# Patient Record
Sex: MALE | Race: OTHER | NOT HISPANIC OR LATINO
[De-identification: names, ages, dates, MRNs, and addresses within clinical notes are randomized per-mention and may not be internally consistent; named-entity substitution may affect disease eponyms.]

---

## 2024-03-31 VITALS
TEMPERATURE: 100 F | RESPIRATION RATE: 16 BRPM | OXYGEN SATURATION: 97 % | HEART RATE: 104 BPM | SYSTOLIC BLOOD PRESSURE: 120 MMHG | DIASTOLIC BLOOD PRESSURE: 72 MMHG

## 2024-03-31 LAB
ANION GAP SERPL CALC-SCNC: 10 MMOL/L — SIGNIFICANT CHANGE UP (ref 5–17)
BASOPHILS # BLD AUTO: 0.03 K/UL — SIGNIFICANT CHANGE UP (ref 0–0.2)
BASOPHILS NFR BLD AUTO: 0.2 % — SIGNIFICANT CHANGE UP (ref 0–2)
BUN SERPL-MCNC: 16 MG/DL — SIGNIFICANT CHANGE UP (ref 7–23)
CALCIUM SERPL-MCNC: 9.6 MG/DL — SIGNIFICANT CHANGE UP (ref 8.4–10.5)
CHLORIDE SERPL-SCNC: 99 MMOL/L — SIGNIFICANT CHANGE UP (ref 96–108)
CO2 SERPL-SCNC: 26 MMOL/L — SIGNIFICANT CHANGE UP (ref 22–31)
CREAT SERPL-MCNC: 1.19 MG/DL — SIGNIFICANT CHANGE UP (ref 0.5–1.3)
EGFR: 85 ML/MIN/1.73M2 — SIGNIFICANT CHANGE UP
EOSINOPHIL # BLD AUTO: 0.01 K/UL — SIGNIFICANT CHANGE UP (ref 0–0.5)
EOSINOPHIL NFR BLD AUTO: 0.1 % — SIGNIFICANT CHANGE UP (ref 0–6)
GLUCOSE SERPL-MCNC: 111 MG/DL — HIGH (ref 70–99)
HCT VFR BLD CALC: 40.7 % — SIGNIFICANT CHANGE UP (ref 39–50)
HGB BLD-MCNC: 13.6 G/DL — SIGNIFICANT CHANGE UP (ref 13–17)
IMM GRANULOCYTES NFR BLD AUTO: 0.3 % — SIGNIFICANT CHANGE UP (ref 0–0.9)
LYMPHOCYTES # BLD AUTO: 0.6 K/UL — LOW (ref 1–3.3)
LYMPHOCYTES # BLD AUTO: 4.2 % — LOW (ref 13–44)
MCHC RBC-ENTMCNC: 30.3 PG — SIGNIFICANT CHANGE UP (ref 27–34)
MCHC RBC-ENTMCNC: 33.4 GM/DL — SIGNIFICANT CHANGE UP (ref 32–36)
MCV RBC AUTO: 90.6 FL — SIGNIFICANT CHANGE UP (ref 80–100)
MONOCYTES # BLD AUTO: 1.27 K/UL — HIGH (ref 0–0.9)
MONOCYTES NFR BLD AUTO: 8.9 % — SIGNIFICANT CHANGE UP (ref 2–14)
NEUTROPHILS # BLD AUTO: 12.27 K/UL — HIGH (ref 1.8–7.4)
NEUTROPHILS NFR BLD AUTO: 86.3 % — HIGH (ref 43–77)
NRBC # BLD: 0 /100 WBCS — SIGNIFICANT CHANGE UP (ref 0–0)
PLATELET # BLD AUTO: 191 K/UL — SIGNIFICANT CHANGE UP (ref 150–400)
POTASSIUM SERPL-MCNC: 4.5 MMOL/L — SIGNIFICANT CHANGE UP (ref 3.5–5.3)
POTASSIUM SERPL-SCNC: 4.5 MMOL/L — SIGNIFICANT CHANGE UP (ref 3.5–5.3)
RBC # BLD: 4.49 M/UL — SIGNIFICANT CHANGE UP (ref 4.2–5.8)
RBC # FLD: 12.4 % — SIGNIFICANT CHANGE UP (ref 10.3–14.5)
SODIUM SERPL-SCNC: 135 MMOL/L — SIGNIFICANT CHANGE UP (ref 135–145)
WBC # BLD: 14.22 K/UL — HIGH (ref 3.8–10.5)
WBC # FLD AUTO: 14.22 K/UL — HIGH (ref 3.8–10.5)

## 2024-03-31 PROCEDURE — 99285 EMERGENCY DEPT VISIT HI MDM: CPT

## 2024-03-31 PROCEDURE — 70491 CT SOFT TISSUE NECK W/DYE: CPT | Mod: 26,MC

## 2024-03-31 RX ORDER — FAMOTIDINE 10 MG/ML
20 INJECTION INTRAVENOUS ONCE
Refills: 0 | Status: COMPLETED | OUTPATIENT
Start: 2024-03-31 | End: 2024-03-31

## 2024-03-31 RX ORDER — AMPICILLIN SODIUM AND SULBACTAM SODIUM 250; 125 MG/ML; MG/ML
3 INJECTION, POWDER, FOR SUSPENSION INTRAMUSCULAR; INTRAVENOUS ONCE
Refills: 0 | Status: COMPLETED | OUTPATIENT
Start: 2024-03-31 | End: 2024-03-31

## 2024-03-31 RX ORDER — KETOROLAC TROMETHAMINE 30 MG/ML
15 SYRINGE (ML) INJECTION ONCE
Refills: 0 | Status: DISCONTINUED | OUTPATIENT
Start: 2024-03-31 | End: 2024-03-31

## 2024-03-31 RX ORDER — DEXAMETHASONE 0.5 MG/5ML
10 ELIXIR ORAL ONCE
Refills: 0 | Status: COMPLETED | OUTPATIENT
Start: 2024-03-31 | End: 2024-03-31

## 2024-03-31 RX ORDER — SODIUM CHLORIDE 9 MG/ML
1000 INJECTION, SOLUTION INTRAVENOUS ONCE
Refills: 0 | Status: COMPLETED | OUTPATIENT
Start: 2024-03-31 | End: 2024-03-31

## 2024-03-31 RX ORDER — DIPHENHYDRAMINE HCL 50 MG
50 CAPSULE ORAL ONCE
Refills: 0 | Status: COMPLETED | OUTPATIENT
Start: 2024-03-31 | End: 2024-03-31

## 2024-03-31 RX ADMIN — AMPICILLIN SODIUM AND SULBACTAM SODIUM 200 GRAM(S): 250; 125 INJECTION, POWDER, FOR SUSPENSION INTRAMUSCULAR; INTRAVENOUS at 21:35

## 2024-03-31 RX ADMIN — Medication 15 MILLIGRAM(S): at 21:34

## 2024-03-31 RX ADMIN — Medication 50 MILLIGRAM(S): at 23:49

## 2024-03-31 RX ADMIN — SODIUM CHLORIDE 1000 MILLILITER(S): 9 INJECTION, SOLUTION INTRAVENOUS at 23:49

## 2024-03-31 RX ADMIN — Medication 102 MILLIGRAM(S): at 21:34

## 2024-03-31 RX ADMIN — FAMOTIDINE 20 MILLIGRAM(S): 10 INJECTION INTRAVENOUS at 23:49

## 2024-03-31 NOTE — ED ADULT TRIAGE NOTE - CHIEF COMPLAINT QUOTE
Pt. presents to ED with a swollen uvula. Pt. dx with strep today, but reports more swelling of his uvula. He is concerned because it is getting more difficult to swallow and notices voices changes.

## 2024-03-31 NOTE — ED ADULT NURSE NOTE - OBJECTIVE STATEMENT
29yo male c/o throat swelling. Pt presents with friend who reports he was seen in an urgent care today and diagnosed with strep, his uvula had moderate amount of swelling and was dc'd with oral abx rx. His throat has since gotten gradually more tight and pt verbalizes it is difficult for him to speak, swallow, and breathe. Pt's visitor states this has happened before approx a year ago, denies hx intubation. Denies allergies. AAOx4, ambulatory, appears mildly uncomfortable but NAD. MD Carter aware, IV access initiated.

## 2024-04-01 ENCOUNTER — TRANSCRIPTION ENCOUNTER (OUTPATIENT)
Age: 29
End: 2024-04-01

## 2024-04-01 ENCOUNTER — INPATIENT (INPATIENT)
Facility: HOSPITAL | Age: 29
LOS: 0 days | Discharge: HOME | DRG: 872 | End: 2024-04-01
Attending: STUDENT IN AN ORGANIZED HEALTH CARE EDUCATION/TRAINING PROGRAM | Admitting: STUDENT IN AN ORGANIZED HEALTH CARE EDUCATION/TRAINING PROGRAM
Payer: COMMERCIAL

## 2024-04-01 VITALS
OXYGEN SATURATION: 97 % | TEMPERATURE: 98 F | HEART RATE: 74 BPM | DIASTOLIC BLOOD PRESSURE: 60 MMHG | SYSTOLIC BLOOD PRESSURE: 101 MMHG | RESPIRATION RATE: 18 BRPM

## 2024-04-01 DIAGNOSIS — Z98.890 OTHER SPECIFIED POSTPROCEDURAL STATES: Chronic | ICD-10-CM

## 2024-04-01 DIAGNOSIS — A41.9 SEPSIS, UNSPECIFIED ORGANISM: ICD-10-CM

## 2024-04-01 DIAGNOSIS — J02.9 ACUTE PHARYNGITIS, UNSPECIFIED: ICD-10-CM

## 2024-04-01 DIAGNOSIS — Z29.9 ENCOUNTER FOR PROPHYLACTIC MEASURES, UNSPECIFIED: ICD-10-CM

## 2024-04-01 PROCEDURE — 70491 CT SOFT TISSUE NECK W/DYE: CPT | Mod: MC

## 2024-04-01 PROCEDURE — 85025 COMPLETE CBC W/AUTO DIFF WBC: CPT

## 2024-04-01 PROCEDURE — 36415 COLL VENOUS BLD VENIPUNCTURE: CPT

## 2024-04-01 PROCEDURE — 80048 BASIC METABOLIC PNL TOTAL CA: CPT

## 2024-04-01 PROCEDURE — 99222 1ST HOSP IP/OBS MODERATE 55: CPT | Mod: GC

## 2024-04-01 PROCEDURE — 96375 TX/PRO/DX INJ NEW DRUG ADDON: CPT

## 2024-04-01 PROCEDURE — 96374 THER/PROPH/DIAG INJ IV PUSH: CPT

## 2024-04-01 PROCEDURE — 99285 EMERGENCY DEPT VISIT HI MDM: CPT | Mod: 25

## 2024-04-01 PROCEDURE — 87040 BLOOD CULTURE FOR BACTERIA: CPT

## 2024-04-01 PROCEDURE — 99236 HOSP IP/OBS SAME DATE HI 85: CPT | Mod: GC

## 2024-04-01 RX ORDER — PANTOPRAZOLE SODIUM 20 MG/1
40 TABLET, DELAYED RELEASE ORAL ONCE
Refills: 0 | Status: COMPLETED | OUTPATIENT
Start: 2024-04-01 | End: 2024-04-01

## 2024-04-01 RX ORDER — DEXAMETHASONE 0.5 MG/5ML
1 ELIXIR ORAL
Qty: 4 | Refills: 0
Start: 2024-04-01 | End: 2024-04-04

## 2024-04-01 RX ORDER — ACETAMINOPHEN 500 MG
650 TABLET ORAL EVERY 6 HOURS
Refills: 0 | Status: DISCONTINUED | OUTPATIENT
Start: 2024-04-01 | End: 2024-04-01

## 2024-04-01 RX ORDER — DEXAMETHASONE 0.5 MG/5ML
5 ELIXIR ORAL EVERY 24 HOURS
Refills: 0 | Status: DISCONTINUED | OUTPATIENT
Start: 2024-04-01 | End: 2024-04-01

## 2024-04-01 RX ORDER — BENZOCAINE AND MENTHOL 5; 1 G/100ML; G/100ML
1 LIQUID ORAL
Qty: 1 | Refills: 0
Start: 2024-04-01 | End: 2024-04-05

## 2024-04-01 RX ORDER — INFLUENZA VIRUS VACCINE 15; 15; 15; 15 UG/.5ML; UG/.5ML; UG/.5ML; UG/.5ML
0.5 SUSPENSION INTRAMUSCULAR ONCE
Refills: 0 | Status: DISCONTINUED | OUTPATIENT
Start: 2024-04-01 | End: 2024-04-01

## 2024-04-01 RX ORDER — SODIUM CHLORIDE 9 MG/ML
1000 INJECTION INTRAMUSCULAR; INTRAVENOUS; SUBCUTANEOUS
Refills: 0 | Status: DISCONTINUED | OUTPATIENT
Start: 2024-04-01 | End: 2024-04-01

## 2024-04-01 RX ORDER — PANTOPRAZOLE SODIUM 20 MG/1
40 TABLET, DELAYED RELEASE ORAL
Refills: 0 | Status: DISCONTINUED | OUTPATIENT
Start: 2024-04-02 | End: 2024-04-01

## 2024-04-01 RX ORDER — ONDANSETRON 8 MG/1
4 TABLET, FILM COATED ORAL EVERY 8 HOURS
Refills: 0 | Status: DISCONTINUED | OUTPATIENT
Start: 2024-04-01 | End: 2024-04-01

## 2024-04-01 RX ORDER — BENZOCAINE AND MENTHOL 5; 1 G/100ML; G/100ML
1 LIQUID ORAL ONCE
Refills: 0 | Status: COMPLETED | OUTPATIENT
Start: 2024-04-01 | End: 2024-04-01

## 2024-04-01 RX ORDER — AMPICILLIN SODIUM AND SULBACTAM SODIUM 250; 125 MG/ML; MG/ML
3 INJECTION, POWDER, FOR SUSPENSION INTRAMUSCULAR; INTRAVENOUS EVERY 6 HOURS
Refills: 0 | Status: DISCONTINUED | OUTPATIENT
Start: 2024-04-01 | End: 2024-04-01

## 2024-04-01 RX ORDER — BENZOCAINE AND MENTHOL 5; 1 G/100ML; G/100ML
1 LIQUID ORAL
Refills: 0 | Status: DISCONTINUED | OUTPATIENT
Start: 2024-04-01 | End: 2024-04-01

## 2024-04-01 RX ORDER — DIPHENHYDRAMINE HYDROCHLORIDE AND LIDOCAINE HYDROCHLORIDE AND ALUMINUM HYDROXIDE AND MAGNESIUM HYDRO
15 KIT EVERY 12 HOURS
Refills: 0 | Status: DISCONTINUED | OUTPATIENT
Start: 2024-04-01 | End: 2024-04-01

## 2024-04-01 RX ADMIN — AMPICILLIN SODIUM AND SULBACTAM SODIUM 200 GRAM(S): 250; 125 INJECTION, POWDER, FOR SUSPENSION INTRAMUSCULAR; INTRAVENOUS at 11:52

## 2024-04-01 RX ADMIN — AMPICILLIN SODIUM AND SULBACTAM SODIUM 200 GRAM(S): 250; 125 INJECTION, POWDER, FOR SUSPENSION INTRAMUSCULAR; INTRAVENOUS at 05:53

## 2024-04-01 RX ADMIN — SODIUM CHLORIDE 100 MILLILITER(S): 9 INJECTION INTRAMUSCULAR; INTRAVENOUS; SUBCUTANEOUS at 05:53

## 2024-04-01 RX ADMIN — PANTOPRAZOLE SODIUM 40 MILLIGRAM(S): 20 TABLET, DELAYED RELEASE ORAL at 05:53

## 2024-04-01 RX ADMIN — BENZOCAINE AND MENTHOL 1 LOZENGE: 5; 1 LIQUID ORAL at 06:13

## 2024-04-01 RX ADMIN — BENZOCAINE AND MENTHOL 1 LOZENGE: 5; 1 LIQUID ORAL at 08:15

## 2024-04-01 NOTE — H&P ADULT - ASSESSMENT
27yo M PMH L ear surgery and multiple prior episodes of pharyngitis/airway edema presented with sore throat, uvular swelling, muffled voice and difficulty tolerating secretions, found to have mild tonsilar and uvular edema concerning for pharyngitis, admitted for IV antibiotics and steroids. 29yo M PMH L ear surgery and multiple prior episodes of pharyngitis/airway edema presented with sore throat, uvular swelling, muffled voice and difficulty tolerating secretions, found to have mild tonsilar and uvular edema concerning for pharyngitis, admitted for IV antibiotics and steroids.

## 2024-04-01 NOTE — DISCHARGE NOTE PROVIDER - ATTENDING DISCHARGE PHYSICAL EXAMINATION:
28 YOM with PMH of pharyngitis/tonsilities who presented with sore throat as well as swelling of tonsils and uvula. States symptoms started two nights ago, progressed to the point his voice changed and he had trouble swallowing food + secretions. States has had two prior episodes about 1.5-2 years apart. VSS. CT neck suggestive of pharyngitis + mild tonsillitis w/o abscess. Significant improvement in symptoms with steroids and amp-sulbactam. Tolerating PO and reports voice close to baseline. No SOB or stridor. Patient requesting for discharge home today due to important obligation tomorrow morning. Will plan for discharge home with PO steroids and amox-clav for 7-day course. Close f/u with ENT w/in one week.

## 2024-04-01 NOTE — H&P ADULT - NSHPLABSRESULTS_GEN_ALL_CORE
LABS:                         13.6   14.22 >-----< 191           ( 03-31-24 @ 21:28 )             40.7       135  |  99  |   16  ----------------------< 111    (03-31-24 @ 21:28)     4.5  |  26  |  1.19    Anion Gap: 10    Ca   9.6   (03-31-24 @ 21:28)  Mg   x    (03-31-24 @ 21:28)  Phos x    (03-31-24 @ 21:28)       TP 9.6     |  AST x     -------------------------     Alb x     |  ALT x               (03-31-24 @ 21:28)  -------------------------     T-bili x   |  AlkPh x     D-bili x          Urinalysis Basic - ( 31 Mar 2024 21:28 )    Color: x / Appearance: x / SG: x / pH: x  Gluc: 111 mg/dL / Ketone: x  / Bili: x / Urobili: x   Blood: x / Protein: x / Nitrite: x   Leuk Esterase: x / RBC: x / WBC x   Sq Epi: x / Non Sq Epi: x / Bacteria: x      I/O SUMMARY:

## 2024-04-01 NOTE — CONSULT NOTE ADULT - ATTENDING COMMENTS
28 M L ear surgery and multiple prior episodes of pharyngitis presented with sore throat, uvular swelling, muffled voice and difficulty tolerating secretions for one day. CT neck showed pharyngitis with swelling of the pharynx, and uvula; there is a mild swelling of the epiglottis without airway compromise. Physical exam as above; no stridor even with forceful breathing.   1. Acute pharyngitis  - agree with Unasyn  - switch to Augment for total of 5 days of antibiotics on discharge  - dexamethasone 28 M L ear surgery and multiple prior episodes of pharyngitis presented with sore throat, uvular swelling, muffled voice and difficulty tolerating secretions for one day. He feels much better after receiving the dexamethasone. CT neck showed pharyngitis with swelling of the pharynx, and uvula; there is a mild swelling of the epiglottis without airway compromise. Physical exam as above; no stridor even with forceful breathing.  1. Acute pharyngitis  - agree with Unasyn  - switch to Augment for total of 5 days of antibiotics on discharge  - dexamethasone

## 2024-04-01 NOTE — DISCHARGE NOTE PROVIDER - NSDCCPCAREPLAN_GEN_ALL_CORE_FT
PRINCIPAL DISCHARGE DIAGNOSIS  Diagnosis: Uvular swelling  Assessment and Plan of Treatment: You were admitted for uvular swelling and pharyngitis, likely due to infection. The ENT (ears nose and throat doctor) looked at your imaging studies and recommend that you follow up with them outpatient.   You will also take augmentin (antibiotics) and steroids (inflammation) for a total of 5 days.   Please follow up with a primary care doctor. We have also made you an appointment with ENT.   If you feel worse, are unable to eat, are unable to manage your saliva, have changes to your voice, or feel any tighetning sensation of your throat - please return to the emergency department for urgent evaluation.

## 2024-04-01 NOTE — H&P ADULT - NSHPSOCIALHISTORY_GEN_ALL_CORE
Tobacco  Alcohol  Drugs    Living situation Former cigarette smoker (1/3 to 1/4 PPD) x 4 years, quit 2022. Social alcohol use (less than weekly). Denies illicit drug use.    Lives in apartment with partner. Works as  of [employer]. Independent in ADLs, ambulates without assistive device.

## 2024-04-01 NOTE — DISCHARGE NOTE PROVIDER - HOSPITAL COURSE
27yo M PMH L ear surgery and multiple prior episodes of pharyngitis/airway edema presented with sore throat, uvular swelling, muffled voice and difficulty tolerating secretions, found to have mild tonsilar and uvular edema concerning for pharyngitis, admitted for IV antibiotics and steroids.     Problem/Plan - 1:  ·  Problem: Sepsis.   ·  Plan: Met 2/4 SIRS criteria on admission w/  and WBCs 14.22, with pharyngitis as source of infection. S/p steroids, IV abx and 1 L LR in the ER.  - pharyngitis plan, as below  - maintenance fluids while NPO.     Problem/Plan - 2:  ·  Problem: Acute pharyngitis.   ·  Plan: Found to have erythematous and swollen tonsils and uvula c/w pharyngitis. CT neck soft tissue with edema and mild tonsilar enlargement L>R. Strep test at second urgent care reportedly inconclusive. Per pt when this happened the last time it was 2/2 strep and resolved with steroids and abx. He reports fevers, chills, sore throat and pain with swallowing at home. Denies cough (aside from secretions), runny nose or other URI symptoms. S/p decadron 10 mg IVPB and Unasyn 3 g IVPB in the ER.  - Decadron 5 mg IVP x4 more doses to complete 5-day course (3/31-4/4)  - Unasyn 3 g q6h x7 day course (3/31-4/7)            - switch to Augmentin for total 7d course on discharge  - cepacol lozenges q2h PRN sore throat  - ENT consulted - nothing to do inpatient, will have follow up with them.     You have tested POSITIVE for the novel coronavirus (COVID-19). Upon discharge, you must self-quarantine for 10 days. Please wear a face mask if you are around other individuals. Try to avoid contact with house members, family, and friends for the duration of this quarantine. If you have high fever, shortness of breathe, extreme fatigue, or bloody cough please call your doctor. 27yo M PMH L ear surgery and multiple prior episodes of pharyngitis/airway edema presented with sore throat, uvular swelling, muffled voice and difficulty tolerating secretions, found to have mild tonsilar and uvular edema concerning for pharyngitis, admitted for IV antibiotics and steroids.     Problem/Plan - 1:  ·  Problem: Sepsis.   ·  Plan: Met 2/4 SIRS criteria on admission w/  and WBCs 14.22, with pharyngitis as source of infection. S/p steroids, IV abx and 1 L LR in the ER.  - pharyngitis plan, as below  - maintenance fluids while NPO.     Problem/Plan - 2:  ·  Problem: Acute pharyngitis.   ·  Plan: Found to have erythematous and swollen tonsils and uvula c/w pharyngitis. CT neck soft tissue with edema and mild tonsilar enlargement L>R. Strep test at second urgent care reportedly inconclusive. Per pt when this happened the last time it was 2/2 strep and resolved with steroids and abx. He reports fevers, chills, sore throat and pain with swallowing at home. Denies cough (aside from secretions), runny nose or other URI symptoms. S/p decadron 10 mg IVPB and Unasyn 3 g IVPB in the ER.  - Decadron 5 mg IVP x4 more doses to complete 5-day course (3/31-4/4)  - Unasyn 3 g q6h x7 day course (3/31-4/7)            - switch to Augmentin for total 7d course on discharge  - Cepacol lozenges q2h PRN sore throat  - ENT consulted - nothing to do inpatient, will have follow up with them.     Physical Exam:   General: resting in bed appropriately conversant, pleasant, coughing throughout interview. speaking in full sentences on room air  Eyes: EOMI intact bilaterally. Anicteric sclerae, moist conjunctivae  HENT: Moist mucous membranes. +uvula and bilateral tonsilar/uvular edema and erythema.  Neck: Trachea midline, supple. No stridor.  Lungs: CTA B/L. No wheezes, rales, or rhonchi  Cardiovascular: RRR. No murmurs, rubs, or gallops  Abdomen: Soft, non-tender non-distended; No rebound or guarding  Extremities: WWP, No clubbing, cyanosis or edema  Neurological: Alert and oriented x3  Skin: Warm and dry. No obvious rash      New medications on discharge: Augmentin, Prednisone  Labs to be followed up: None  Imaging to be done as outpatient: ENT outpatient   Further outpatient workup: ENT outpatient    27yo M PMH L ear surgery and multiple prior episodes of pharyngitis/airway edema presented with sore throat, uvular swelling, muffled voice and difficulty tolerating secretions, found to have mild tonsilar and uvular edema concerning for pharyngitis, admitted for IV antibiotics and steroids.    #Sepsis.   Met 2/4 SIRS criteria on admission w/  and WBCs 14.22, with pharyngitis as source of infection. S/p steroids, IV abx and 1 L LR in the ER. BCx sent.   - pharyngitis plan, as below    #Acute pharyngitis.   Found to have erythematous and swollen tonsils and uvula c/w pharyngitis. CT neck soft tissue with edema and mild tonsilar enlargement L>R. Strep test at second urgent care reportedly inconclusive. Per pt when this happened the last time it was 2/2 strep and resolved with steroids and abx. He reports fevers, chills, sore throat and pain with swallowing at home. Denies cough (aside from secretions), runny nose or other URI symptoms. S/p decadron 10 mg IVPB and Unasyn 3 g IVPB in the ER.  - Decadron 6 mg PO to complete 5-day course (3/31-4/4)  - Unasyn 3 g q6h x7 day course (3/31-4/7)            - switch to Augmentin for total 7d course on discharge  - Cepacol lozenges q2h PRN sore throat  - ENT consulted - nothing to do inpatient, will have follow up with them.     Physical Exam:   General: resting in bed appropriately conversant, pleasant, coughing throughout interview. speaking in full sentences on room air  Eyes: EOMI intact bilaterally. Anicteric sclerae, moist conjunctivae  HENT: Moist mucous membranes. +uvula and bilateral tonsilar/uvular edema and erythema.  Neck: Trachea midline, supple. No stridor.  Lungs: CTA B/L. No wheezes, rales, or rhonchi  Cardiovascular: RRR. No murmurs, rubs, or gallops  Abdomen: Soft, non-tender non-distended; No rebound or guarding  Extremities: WWP, No clubbing, cyanosis or edema  Neurological: Alert and oriented x3  Skin: Warm and dry. No obvious rash      New medications on discharge: Augmentin, Prednisone  Labs to be followed up: None  Imaging to be done as outpatient: ENT outpatient   Further outpatient workup: ENT outpatient    29yo M PMH L ear surgery and multiple prior episodes of pharyngitis/airway edema presented with sore throat, uvular swelling, muffled voice and difficulty tolerating secretions, found to have mild tonsilar and uvular edema concerning for pharyngitis, admitted for IV antibiotics and steroids. Now improving, tolerating solid food, stable for discharge.     #Sepsis.   Met 2/4 SIRS criteria on admission w/  and WBCs 14.22, with pharyngitis as source of infection. S/p steroids, IV abx and 1 L LR in the ER. BCx sent.   - pharyngitis plan, as below    #Acute pharyngitis.   Found to have erythematous and swollen tonsils and uvula c/w pharyngitis. CT neck soft tissue with edema and mild tonsilar enlargement L>R. Strep test at second urgent care reportedly inconclusive. Per pt when this happened the last time it was 2/2 strep and resolved with steroids and abx. He reports fevers, chills, sore throat and pain with swallowing at home. Denies cough (aside from secretions), runny nose or other URI symptoms. S/p decadron 10 mg IVPB and Unasyn 3 g IVPB in the ER. Now improving, without respiratory distress, tolerating PO intake including solid food.   - Decadron 6 mg PO to complete 5-day course (3/31-4/4)  - Unasyn 3 g q6h x7 day course (3/31-4/7)            - switch to Augmentin for total 7d course on discharge  - Cepacol lozenges q2h PRN sore throat  - ENT consulted - nothing to do inpatient, will have follow up with them.     Physical Exam:   General: resting in bed appropriately conversant, pleasant, coughing throughout interview. speaking in full sentences on room air  Eyes: EOMI intact bilaterally. Anicteric sclerae, moist conjunctivae  HENT: Moist mucous membranes. +uvula and bilateral tonsilar/uvular edema and erythema.  Neck: Trachea midline, supple. No stridor.  Lungs: CTA B/L. No wheezes, rales, or rhonchi  Cardiovascular: RRR. No murmurs, rubs, or gallops  Abdomen: Soft, non-tender non-distended; No rebound or guarding  Extremities: WWP, No clubbing, cyanosis or edema  Neurological: Alert and oriented x3  Skin: Warm and dry. No obvious rash      New medications on discharge: Augmentin, Prednisone  Labs to be followed up: None  Imaging to be done as outpatient: ENT outpatient   Further outpatient workup: ENT outpatient    29yo M PMH L ear surgery and multiple prior episodes of pharyngitis/airway edema presented with sore throat, uvular swelling, muffled voice and difficulty tolerating secretions, found to have mild tonsilar and uvular edema concerning for pharyngitis, admitted for IV antibiotics and steroids. Now improving, tolerating solid food, stable for discharge.     #Sepsis.   Met 2/4 SIRS criteria on admission w/  and WBCs 14.22, with pharyngitis as source of infection. S/p steroids, IV abx and 1 L LR in the ER. BCx sent.   - pharyngitis plan, as below    #Acute pharyngitis.   Found to have erythematous and swollen tonsils and uvula c/w pharyngitis. CT neck soft tissue with edema and mild tonsilar enlargement L>R. Strep test at second urgent care reportedly inconclusive. Per pt when this happened the last time it was 2/2 strep and resolved with steroids and abx. He reports fevers, chills, sore throat and pain with swallowing at home. Denies cough (aside from secretions), runny nose or other URI symptoms. S/p decadron 10 mg IVPB and Unasyn 3 g IVPB in the ER. Now improving, without respiratory distress, tolerating PO intake including solid food.   - Decadron 6 mg PO to complete 5-day course (3/31-4/4)  - Unasyn 3 g q6h x7 day course (3/31-4/7)            - switch to Augmentin for total 7d course on discharge  - Cepacol lozenges q2h PRN sore throat  - ENT consulted - nothing to do inpatient, will have follow up with them.     Physical Exam:   General: resting in bed appropriately conversant, pleasant, coughing throughout interview. speaking in full sentences on room air  Eyes: EOMI intact bilaterally. Anicteric sclerae, moist conjunctivae  HENT: Moist mucous membranes. +uvula and bilateral tonsilar/uvular edema and erythema.  Neck: Trachea midline, supple. No stridor.  Lungs: CTA B/L. No wheezes, rales, or rhonchi  Cardiovascular: RRR. No murmurs, rubs, or gallops  Abdomen: Soft, non-tender non-distended; No rebound or guarding  Extremities: WWP, No clubbing, cyanosis or edema  Neurological: Alert and oriented x3  Skin: Warm and dry. No obvious rash      New medications on discharge: Augmentin, dexamethasone  Labs to be followed up: None  Imaging to be done as outpatient: ENT outpatient   Further outpatient workup: ENT outpatient

## 2024-04-01 NOTE — DISCHARGE NOTE PROVIDER - NSDCMRMEDTOKEN_GEN_ALL_CORE_FT
amoxicillin-clavulanate 875 mg-125 mg oral tablet: 875 milligram(s) orally every 12 hours  benzocaine-menthol 15 mg-2.6 mg mucous membrane lozenge: 1 lozenge orally every 4 hours  dexAMETHasone 6 mg oral tablet: 1 tab(s) orally once a day

## 2024-04-01 NOTE — DISCHARGE NOTE PROVIDER - NSDCFUSCHEDAPPT_GEN_ALL_CORE_FT
Ivan Chappell  [facility]  [address]  Scheduled Appointment: 04/08/2024

## 2024-04-01 NOTE — H&P ADULT - PROBLEM SELECTOR PLAN 2
Found to have erythematous and swollen tonsils and uvula c/w pharyngitis. CT neck soft tissue with edema and mild tonsilar enlargement L>R. Strep test at second urgent care reportedly inconclusive. Per pt when this happened the last time it was 2/2 strep and resolved with steroids and abx. He reports fevers, chills, sore throat and pain with swallowing at home. Denies cough, runny nose or other URI symptoms. S/p decadron 10 mg IVPB and Unasyn 3 g IVPB in the ER.  - decadron 5 mg IVP x4 more doses to complete 5-day course (3/31-4/4)  - Unasyn 3 g q6h x7 day course (3/31-4/7)  - follow up with ENT in AM for formal consult or if clinical status worsens Found to have erythematous and swollen tonsils and uvula c/w pharyngitis. CT neck soft tissue with edema and mild tonsilar enlargement L>R. Strep test at second urgent care reportedly inconclusive. Per pt when this happened the last time it was 2/2 strep and resolved with steroids and abx. He reports fevers, chills, sore throat and pain with swallowing at home. Denies cough, runny nose or other URI symptoms. S/p decadron 10 mg IVPB and Unasyn 3 g IVPB in the ER.  - decadron 5 mg IVP x4 more doses to complete 5-day course (3/31-4/4)  - Unasyn 3 g q6h x7 day course (3/31-4/7)  - ENT consulted, magalis cruz Found to have erythematous and swollen tonsils and uvula c/w pharyngitis. CT neck soft tissue with edema and mild tonsilar enlargement L>R. Strep test at second urgent care reportedly inconclusive. Per pt when this happened the last time it was 2/2 strep and resolved with steroids and abx. He reports fevers, chills, sore throat and pain with swallowing at home. Denies cough (aside from secretions), runny nose or other URI symptoms. S/p decadron 10 mg IVPB and Unasyn 3 g IVPB in the ER.  - decadron 5 mg IVP x4 more doses to complete 5-day course (3/31-4/4)  - Unasyn 3 g q6h x7 day course (3/31-4/7)  - ENT consulted, magalis cruz Found to have erythematous and swollen tonsils and uvula c/w pharyngitis. CT neck soft tissue with edema and mild tonsilar enlargement L>R. Strep test at second urgent care reportedly inconclusive. Per pt when this happened the last time it was 2/2 strep and resolved with steroids and abx. He reports fevers, chills, sore throat and pain with swallowing at home. Denies cough (aside from secretions), runny nose or other URI symptoms. S/p decadron 10 mg IVPB and Unasyn 3 g IVPB in the ER.  - decadron 5 mg IVP x4 more doses to complete 5-day course (3/31-4/4)  - Unasyn 3 g q6h x7 day course (3/31-4/7)            - switch to Augmentin for total 7d course on discharge  - ENT consulted, magalis cruz Found to have erythematous and swollen tonsils and uvula c/w pharyngitis. CT neck soft tissue with edema and mild tonsilar enlargement L>R. Strep test at second urgent care reportedly inconclusive. Per pt when this happened the last time it was 2/2 strep and resolved with steroids and abx. He reports fevers, chills, sore throat and pain with swallowing at home. Denies cough (aside from secretions), runny nose or other URI symptoms. S/p decadron 10 mg IVPB and Unasyn 3 g IVPB in the ER.  - decadron 5 mg IVP x4 more doses to complete 5-day course (3/31-4/4)  - Unasyn 3 g q6h x7 day course (3/31-4/7)            - switch to Augmentin for total 7d course on discharge  - cepacol lozenges q2h PRN sore throat  - ENT consulted, appreciate recs

## 2024-04-01 NOTE — ED PROVIDER NOTE - OBJECTIVE STATEMENT
28-year-old male comes in for evaluation of sore throat, uvular swelling, muffled voice, difficulty tolerating secretions.  Patient says last night he started feeling an itchy soreness in the left side of his throat, this morning felt it on both sides with subjective fevers and chills, went to urgent care this morning and had a negative strep swab.  Was given amoxicillin 500 mg twice daily, but throughout the day felt like his uvula was getting more swollen and it was hard for him to swallow.  Went back to [facility] and had another strep swab that was reportedly faintly positive, was then told to go to the ED for further evaluation.    Patient says he had a similar presentation last year where he had strep infection followed by uvular swelling, resolved with a course of steroids, did not develop abscess or require any drainage.  Was told it could be some type of histamine reaction?

## 2024-04-01 NOTE — H&P ADULT - PROBLEM SELECTOR PLAN 3
F: Tolerating PO, no IVF  E: Replete K<4, Mg<2  N: NPO until laryngoscopy  VTE Ppx: SCDs  GI: not needed  C: Full Code    Problem: Nutrition, metabolism, and development symptoms

## 2024-04-01 NOTE — H&P ADULT - ATTENDING COMMENTS
28 YOM with PMH of pharyngitis/tonsilities who presented with sore throat as well as swelling of tonsils and uvula. Seen this morning - states symptoms started two nights ago, progressed to the point his voice changed and he had trouble swallowing food + secretions. States has had two prior episodes about 1.5-2 years apart. VSS. CT neck suggestive of pharyngitis + mild tonsillitis w/o abscess. Significant improvement in symptoms with steroids and amp-sulbactam. Tolerating PO and reports voice close to baseline. No SOB or stridor. Patient requesting for discharge home today due to important obligation tomorrow morning. Will plan for discharge home with PO steroids and amox-clav for 7-day course. Close f/u with ENT w/in one week.

## 2024-04-01 NOTE — H&P ADULT - NSHPPHYSICALEXAM_GEN_ALL_CORE
General: in no acute distress  Eyes: EOMI intact bilaterally. Anicteric sclerae, moist conjunctivae  HENT: Moist mucous membranes  Neck: Trachea midline, supple  Lungs: CTA B/L. No wheezes, rales, or rhonchi  Cardiovascular: RRR. No murmurs, rubs, or gallops  Abdomen: Soft, non-tender non-distended; No rebound or guarding  Extremities: WWP, No clubbing, cyanosis or edema  MSK: No midline bony tenderness. No CVA tenderness bilaterally  Neurological: Alert and oriented x3  Skin: Warm and dry. No obvious rash General: resting in bed appropriately conversant, pleasant, coughing throughout interview. speaking in full sentences on room air  Eyes: EOMI intact bilaterally. Anicteric sclerae, moist conjunctivae  HENT: Moist mucous membranes. +uvula and bilateral tonsilar/uvular edema and erythema. +pooling of secretions, muffled voice.   Neck: Trachea midline, supple. No stridor.  Lungs: CTA B/L. No wheezes, rales, or rhonchi  Cardiovascular: RRR. No murmurs, rubs, or gallops  Abdomen: Soft, non-tender non-distended; No rebound or guarding  Extremities: WWP, No clubbing, cyanosis or edema  Neurological: Alert and oriented x3  Skin: Warm and dry. No obvious rash

## 2024-04-01 NOTE — DISCHARGE NOTE PROVIDER - NSDCFUADDAPPT_GEN_ALL_CORE_FT
Please confirm with your insurance company that your scheduled ENT appointment with Dr. Chappell is covered.     You may follow-up with a primary care doctor of your choice.

## 2024-04-01 NOTE — H&P ADULT - PROBLEM SELECTOR PLAN 1
Met 2/4 SIRS criteria on admission w/  and WBCs 14.22, with pharyngitis as source of infection. S/p steroids, IV abx and 1 L LR in the ER.  - treat with 5-day steroid course for airway edema  - 7-day abx course for odontogenic soft tissue infection Met 2/4 SIRS criteria on admission w/  and WBCs 14.22, with pharyngitis as source of infection. S/p steroids, IV abx and 1 L LR in the ER.  - pharyngitis plan, as below  - maintenance fluids while NPO

## 2024-04-01 NOTE — DISCHARGE NOTE PROVIDER - CARE PROVIDER_API CALL
Ivan Chappell  Otolaryngology  [address] [place] [address], [address]  [place] [address]  Phone: [phone]  Fax: [phone]  Scheduled Appointment: 04/08/2024 03:00 PM

## 2024-04-01 NOTE — H&P ADULT - HISTORY OF PRESENT ILLNESS
28-year-old male comes in for evaluation of sore throat, uvular swelling, muffled voice, difficulty tolerating secretions.  Patient says last night he started feeling an itchy soreness in the left side of his throat, this morning felt it on both sides with subjective fevers and chills, went to urgent care this morning and had a negative strep swab.  Was given amoxicillin 500 mg twice daily, but throughout the day felt like his uvula was getting more swollen and it was hard for him to swallow.  [facility]ent back to [facility] and had another strep swab that was reportedly faintly positive, was then told to go to the ED for further evaluation.    Patient says he had a similar presentation last year where he had strep infection followed by uvular swelling, resolved with a course of steroids, did not develop abscess or require any drainage.  Was told it could be some type of histamine reaction?    In the ED:    - VS: Tmax: , HR:  , BP:  , RR:  , O2:    %     - Pertinent Labs:     - Imaging: CXR: CT: US: Cath: EKG:     - Treatment/interventions:     PMHx:   PSHx:  Meds: See med rec  Allergies:  Social: see below      27yo M PMH L ear surgery and multiple prior episodes of pharyngitis presented with sore throat, uvular swelling, muffled voice and difficulty tolerating secretions. Woke up on Sunday AM with throat pain and uvular swelling, also noticed his voice sounded different. This has happened to him before and he was treated with IV steroids and IV abx with full resolution of symptoms. States the last time this happened it was 2/2 strep throat. Went to urgent care where he was tested for strep (negative) and given IV abx but no steroids. Returned home and developed fevers, chills and worse throat pain so went to different urgent care where he was again tested for strep (inconclusive result) and sent to the ER. Reports since receiving steroids and abx in the ER that his voice still sounds very different but improved from when he first came in. Also states that his throat pain is still present but improved as well.     ENT consulted in the ER, per ED provider note they reviewed the CT and rec'd no intervention but continue abx. MICU consulted for concern for airway patency.    In the ED:    - VS: Tmax: 100.1 F oral, HR: 104, BP: 120/72, RR: 16, SpO2: 97% on RA    - Pertinent Labs: WBCs 14.22 w/ 86.3% neutrophils, BMP wnl    - Imaging:   ·	CT neck soft tissue w/ IV con: L palatine tonsil larger than R, submucosal edema of the nasopharynx, oropharynx, and hypopharynx with mild edema of the epiglottis, no airway narrowing, no retropharyngeal collection, no peritonsillar abscess, mild tonsillitis is not excluded.  ·	CXR: CT: US: Cath: EKG:     - Treatment/interventions: 1l LR,  Unasyn 3 g IVPB, Decadron 10 mg IVPB, Benadryl 50 mg IVP, Famotidine 20 mg IVP, Toradol 15 mg IVP    - Consults: ENT, MICU 27yo M PMH L ear surgery and multiple prior episodes of pharyngitis presented with sore throat, uvular swelling, muffled voice and difficulty tolerating secretions. Woke up on Sunday AM with throat pain and uvular swelling, also noticed his voice sounded different. This has happened to him before and he was treated with IV steroids and IV abx with full resolution of symptoms. States the last time this happened it was 2/2 strep throat. Went to urgent care where he was tested for strep (negative) and given IV abx but no steroids. Returned home and developed fevers, chills and worse throat pain so went to different urgent care where he was again tested for strep (inconclusive result) and sent to the ER. Reports since receiving steroids and abx in the ER that his voice still sounds very different but improved from when he first came in. Also states that his throat pain is still present but improved as well.     ENT consulted in the ER, per ED provider note they reviewed the CT and rec'd no intervention but continue abx. MICU consulted for concern for airway patency.    In the ED:    - VS: Tmax: 100.1 F oral, HR: 104, BP: 120/72, RR: 16, SpO2: 97% on RA    - Pertinent Labs: WBCs 14.22 w/ 86.3% neutrophils, BUN 16, Cr 1.19    - Imaging:   ·	CT neck soft tissue w/ IV con: L palatine tonsil larger than R, submucosal edema of the nasopharynx, oropharynx, and hypopharynx with mild edema of the epiglottis, no airway narrowing, no retropharyngeal collection, no peritonsillar abscess, mild tonsillitis is not excluded.    - Treatment/interventions: 1L LR, Unasyn 3 g IVPB, Decadron 10 mg IVPB, Benadryl 50 mg IVP, Famotidine 20 mg IVP, Toradol 15 mg IVP    - Consults: ENT, MICU 29yo M PMH L ear surgery (2006 ISO infx) and multiple prior episodes of pharyngitis presented with sore throat, uvular swelling, muffled voice and difficulty tolerating secretions. Woke up on Sunday AM with throat pain and uvular swelling, also noticed his voice sounded different. This has happened to him before and he was treated with IV steroids and IV abx with full resolution of symptoms. States the last time this happened it was 2/2 strep throat. Went to urgent care where he was tested for strep (negative) and given IV abx but no steroids. Returned home and developed fevers, chills and worse throat pain so went to different urgent care where he was again tested for strep (inconclusive result) and sent to the ER. Reports since receiving steroids and abx in the ER that his voice still sounds very different but improved from when he first came in. Also states that his throat pain is still present but improved as well.     ENT consulted in the ER, per ED provider note they reviewed the CT and rec'd no intervention but continue abx. MICU was consulted for concern for airway patency, deemed stable for RMF.    In the ED:    - VS: Tmax: 100.1 F oral, HR: 104, BP: 120/72, RR: 16, SpO2: 97% on RA    - Pertinent Labs: WBCs 14.22 w/ 86.3% neutrophils, BUN 16, Cr 1.19    - Imaging:   ·	CT neck soft tissue w/ IV con: L palatine tonsil larger than R, submucosal edema of the nasopharynx, oropharynx, and hypopharynx with mild edema of the epiglottis, no airway narrowing, no retropharyngeal collection, no peritonsillar abscess, mild tonsillitis is not excluded.    - Treatment/interventions: 1L LR, Unasyn 3 g IVPB, Decadron 10 mg IVPB, Benadryl 50 mg IVP, Famotidine 20 mg IVP, Toradol 15 mg IVP    - Consults: ENT, MICU

## 2024-04-01 NOTE — PATIENT PROFILE ADULT - FALL HARM RISK - UNIVERSAL INTERVENTIONS
Bed in lowest position, wheels locked, appropriate side rails in place/Call bell, personal items and telephone in reach/Instruct patient to call for assistance before getting out of bed or chair/Non-slip footwear when patient is out of bed/[place] to call system/Physically safe environment - no spills, clutter or unnecessary equipment/Purposeful Proactive Rounding/Room/bathroom lighting operational, light cord in reach

## 2024-04-01 NOTE — CONSULT NOTE ADULT - SUBJECTIVE AND OBJECTIVE BOX
[facility] SERVICE CONSULTATION NOTE    CC: throat pain and uvula swelling    HPI:  27yo M PMH L ear surgery and multiple prior episodes of pharyngitis presented with sore throat, uvular swelling, muffled voice and difficulty tolerating secretions. Woke up on Sunday AM with throat pain and uvular swelling, also noticed his voice sounded different. This has happened to him before and he was treated with IV steroids and IV abx with full resolution of symptoms. States the last time this happened it was 2/2 strep throat. Went to urgent care where he was tested for strep (negative) and given IV abx but no steroids. Returned home and developed fevers, chills and worse throat pain so went to [facility] urgent care where he was again tested for strep (inconclusive result) and sent to the ER. Reports since receiving steroids and abx in the ER that his voice still sounds very different but improved from when he first came in. Also states that his throat pain is still present but improved as well. ENT consulted in the ER, per ED provider note they reviewed the CT and rec'd no intervention but continue abx. MICU consulted for concern for airway patency.    VS: T 100.1 F oral, , /72, RR 16, SpO2 97% on RA  Labs significant for: WBCs 14.22 w/ 86.3% neutrophils, BMP wnl  EKG:   Imaging:    CT neck soft tissue w/ IV con: L palatine tonsil larger than R, submucosal edema of the nasopharynx, oropharynx, and hypopharynx with mild edema of the epiglottis, no airway narrowing, no retropharyngeal collection, no peritonsillar abscess, mild tonsillitis is not excluded.  Interventions: Unasyn 3 g IVPB, Decadron 10 mg IVPB, Benadryl 50 mg IVP, Famotidine 20 mg IVP, Toradol 15 mg IVP, LR 1 L bolus  Consults: ENT, MICU      VITAL SIGNS:  ER Vital Signs Last 24 Hrs  T(C): 36.5 (01 Apr 2024 02:26), Max: 37.8 (31 Mar 2024 21:00)  T(F): 97.7 (01 Apr 2024 02:26), Max: 100.1 (31 Mar 2024 21:00)  HR: 81 (01 Apr 2024 02:26) (81 - 104)  BP: 113/67 (01 Apr 2024 02:26) (113/67 - 126/67)  BP(mean): --  ABP: --  ABP(mean): --  RR: 18 (01 Apr 2024 02:26) (16 - 18)  SpO2: 97% (01 Apr 2024 02:26) (96% - 97%)    O2 Parameters below as of 01 Apr 2024 02:26  Patient On (Oxygen Delivery Method): room air          CAPILLARY BLOOD GLUCOSE          PHYSICAL EXAM:  Constitutional: young adult male, ill-appearing and intermittently coughing up phlegm, sitting up in bed in NAD  HEENT: NC/AT, EOMI, erythematous and edematous uvula and b/l tonsils, somewhat muffled voice, MMM  Neck: supple, no stridor  Respiratory: CTAB, no increased work of breathing, no wheezing, speaking in full sentences  Cardiovascular: RRR, normal +S1/S2, no murmurs appreciated  Gastrointestinal: abdomen soft, NT/ND  Extremities: WWP, no peripheral edema  Vascular: 2+ radial and PT pulses b/l  Neurological: AOx3    LABS:                        13.6   14.22 )-----------( 191      ( 31 Mar 2024 21:28 )             40.7     03-31    135  |  99  |  16  ----------------------------<  111<H>  4.5   |  26  |  1.19    Ca    9.6      31 Mar 2024 21:28              Urinalysis Basic - ( 31 Mar 2024 21:28 )    Color: x / Appearance: x / SG: x / pH: x  Gluc: 111 mg/dL / Ketone: x  / Bili: x / Urobili: x   Blood: x / Protein: x / Nitrite: x   Leuk Esterase: x / RBC: x / WBC x   Sq Epi: x / Non Sq Epi: x / Bacteria: x          EKG: Reviewed.    RADIOLOGY & ADDITIONAL TESTS: Reviewed.

## 2024-04-01 NOTE — DISCHARGE NOTE NURSING/CASE MANAGEMENT/SOCIAL WORK - PATIENT PORTAL LINK FT
You can access the FollowMyHealth Patient Portal offered by [facility] by registering at the following website: [email or web address]. By joining [facility]’s FollowMyHealth portal, you will also be able to view your health information using other applications (apps) compatible with our system.

## 2024-04-01 NOTE — ED PROVIDER NOTE - CLINICAL SUMMARY MEDICAL DECISION MAKING FREE TEXT BOX
Triage vitals with low-grade temp 100.1, mildly tachycardic 104, vitals otherwise normal, no respiratory distress, normal O2 sat.  On exam patient speaking full sentences, voice slightly muffled, spitting up phlegm.  Oropharynx with uvula midline, + uvula swelling, no sign of PTA.  Mild tonsillar erythema and scant exudate bilaterally.    Plan for labs, CT neck, IV antibiotics/steroids/Toradol/antihistamine, ENT consult  ---> Labs with mild leukocytosis, patient reports feeling improvement after meds.  Repeat exam with mostly unchanged uvular swelling, however patient voices sounds less muffled and tolerating secretions better.  Discussed case with ENT who reviewed scans, nothing to drain.  Will admit patient for observation, IV abx, upon discharge should be switched from amoxicillin to Augmentin.

## 2024-04-01 NOTE — CONSULT NOTE ADULT - ASSESSMENT
27yo M PMH L ear surgery and multiple prior episodes of pharyngitis/airway edema presented with sore throat, uvular swelling, muffled voice and difficulty tolerating secretions, found to have mild tonsilar and uvular edema concerning for pharyngitis. Some improvement in symptoms following IV steroids and abx. MICU consulted for concern for airway patency.    #sepsis  Met 2/4 SIRS criteria on admission w/  and WBCs 14.22, with pharyngitis as source of infection. S/p steroids, IV abx and 1 L LR in the ER.  - treat with 5-day steroid course for airway edema  - 7-day abx course for odontogenic soft tissue infection    #pharyngitis  Found to have erythematous and swollen tonsils and uvula c/w pharyngitis. CT neck soft tissue with edema and mild tonsilar enlargement L>R. Strep test at second urgent care reportedly inconclusive. Per pt when this happened the last time it was 2/2 strep and resolved with steroids and abx. He reports fevers, chills, sore throat and pain with swallowing at home. Denies cough, runny nose or other URI symptoms. S/p decadron 10 mg IVPB and Unasyn 3 g IVPB in the ER.  - decadron 5 mg IVP x4 more doses to complete 5-day course (3/31-4/4)  - Unasyn 3 g q6h x7 day course (3/31-4/7)  - follow up with ENT in AM for formal consult or if clinical status worsens    Dispo: RMF. Airway edema and dysphonia improving with steroids and antibiotics. Per ENT no urgent need for scope or other interventions.  Case discussed with intensivist Dr. Dunham.

## 2024-04-01 NOTE — ED PROVIDER NOTE - PHYSICAL EXAMINATION
CONST: nontoxic NAD speaking in full sentences voice muffled  HEAD: atraumatic  EYES: conjunctivae clear  NECK: supple  ENMT: normal external/lips/tongue. +uvula swollen but midline, +thick secretions. Tonsils mildly enlarged b/l with some erythema. No sign of PTA  CARD: regular rate  CHEST: breathing comfortably, no stridor/retractions/tripoding  EXT: FROM  SKIN: warm, dry  NEURO: awake alert answering questions following commands moving all extremities

## 2024-04-06 LAB
CULTURE RESULTS: SIGNIFICANT CHANGE UP
CULTURE RESULTS: SIGNIFICANT CHANGE UP
SPECIMEN SOURCE: SIGNIFICANT CHANGE UP
SPECIMEN SOURCE: SIGNIFICANT CHANGE UP

## 2024-04-08 ENCOUNTER — APPOINTMENT (OUTPATIENT)
Dept: OTOLARYNGOLOGY | Facility: CLINIC | Age: 29
End: 2024-04-08

## 2024-04-08 DIAGNOSIS — J02.9 ACUTE PHARYNGITIS, UNSPECIFIED: ICD-10-CM

## 2024-04-08 DIAGNOSIS — Z87.891 PERSONAL HISTORY OF NICOTINE DEPENDENCE: ICD-10-CM

## 2024-04-08 DIAGNOSIS — A41.9 SEPSIS, UNSPECIFIED ORGANISM: ICD-10-CM
